# Patient Record
(demographics unavailable — no encounter records)

---

## 2024-11-12 NOTE — PHYSICAL EXAM
[General Appearance - Alert] : alert [General Appearance - In No Acute Distress] : in no acute distress [General Appearance - Well Nourished] : well nourished [General Appearance - Well Developed] : well developed [General Appearance - Well-Appearing] : healthy appearing [Sclera] : the sclera and conjunctiva were normal [PERRL With Normal Accommodation] : pupils were equal in size, round, and reactive to light [Extraocular Movements] : extraocular movements were intact [Outer Ear] : the ears and nose were normal in appearance [Neck Appearance] : the appearance of the neck was normal [Jugular Venous Distention Increased] : there was no jugular-venous distention [] : no respiratory distress [Respiration, Rhythm And Depth] : normal respiratory rhythm and effort [Exaggerated Use Of Accessory Muscles For Inspiration] : no accessory muscle use [Auscultation Breath Sounds / Voice Sounds] : lungs were clear to auscultation bilaterally [Heart Sounds] : normal S1 and S2 [Heart Sounds Pericardial Friction Rub] : no pericardial rub [Arterial Pulses Carotid] : carotid pulses were normal with no bruits [Edema] : there was no peripheral edema [Bowel Sounds] : normal bowel sounds [Abdomen Soft] : soft [Abdomen Tenderness] : non-tender [No CVA Tenderness] : no ~M costovertebral angle tenderness [Abnormal Walk] : normal gait [Skin Color & Pigmentation] : normal skin color and pigmentation [Skin Turgor] : normal skin turgor [Motor Exam] : the motor exam was normal [Oriented To Time, Place, And Person] : oriented to person, place, and time [Impaired Insight] : insight and judgment were intact [Affect] : the affect was normal [Mood] : the mood was normal

## 2024-11-12 NOTE — HISTORY OF PRESENT ILLNESS
[FreeTextEntry1] : Patient with history of left hip osteoarthritis, pending left hip replacement, which was deferred secondary to finding of low serum sodium of 127.  She notes past low sodium levels 132-134.  She has significant discomfort due to hip arthritis.  Had been using NSAIDs daily but stopped about one month ago.  She does not add salt to food, feels her diet is low in protein and drinks about one liter of fluid per day.  Patient also with history of breast cancer.  [facility] follows patient and note is made of previous lung CT showing right apical scarring versus nodule.  She is a nonsmoker.  History also includes GERD, treated with PPI, and hyperlipidemia.   does not note any mental status changes and patient feels she is functioning at her historical level.

## 2024-11-12 NOTE — ASSESSMENT
[FreeTextEntry1] : 1. Hyponatremia - her clinical stability along with past history would indicate a chronic component.  May have reset osmostat exacerbated with pain (ADH release), use of NSAIDs, and limited protein intake.  Will repeat labs, instructed on fluid restriction, addition of sodium to diet and increase protein intake.  Further recommendations per clinical course. Does not appear that lung nodule has been considered a malignant issue and therefore SIADH due to small cell lung CA is unlikely.  She has also electively held her PPI for now.

## 2024-11-12 NOTE — REVIEW OF SYSTEMS
[Constipation] : constipation [Heartburn] : heartburn [Arthralgias] : arthralgias [Joint Stiffness] : joint stiffness [Negative] : Heme/Lymph [FreeTextEntry8] : nocturia [FreeTextEntry9] : left hip